# Patient Record
Sex: MALE | URBAN - METROPOLITAN AREA
[De-identification: names, ages, dates, MRNs, and addresses within clinical notes are randomized per-mention and may not be internally consistent; named-entity substitution may affect disease eponyms.]

---

## 2022-05-25 ENCOUNTER — APPOINTMENT (RX ONLY)
Dept: URBAN - METROPOLITAN AREA CLINIC 236 | Facility: CLINIC | Age: 87
Setting detail: DERMATOLOGY
End: 2022-05-25

## 2022-05-25 DIAGNOSIS — B37.2 CANDIDIASIS OF SKIN AND NAIL: ICD-10-CM | Status: INADEQUATELY CONTROLLED

## 2022-05-25 PROCEDURE — ? PRESCRIPTION MEDICATION MANAGEMENT

## 2022-05-25 PROCEDURE — ? PRESCRIPTION

## 2022-05-25 PROCEDURE — 99213 OFFICE O/P EST LOW 20 MIN: CPT

## 2022-05-25 PROCEDURE — ? COUNSELING

## 2022-05-25 RX ORDER — KETOCONAZOLE 20 MG/G
CREAM TOPICAL BID
Qty: 60 | Refills: 1 | Status: ERX | COMMUNITY
Start: 2022-05-25

## 2022-05-25 RX ORDER — FLUCONAZOLE 100 MG/1
TABLET ORAL
Qty: 14 | Refills: 0 | Status: ERX | COMMUNITY
Start: 2022-05-25

## 2022-05-25 RX ADMIN — FLUCONAZOLE: 100 TABLET ORAL at 00:00

## 2022-05-25 RX ADMIN — KETOCONAZOLE: 20 CREAM TOPICAL at 00:00

## 2022-05-25 ASSESSMENT — LOCATION ZONE DERM: LOCATION ZONE: TRUNK

## 2022-05-25 ASSESSMENT — LOCATION SIMPLE DESCRIPTION DERM
LOCATION SIMPLE: LEFT BUTTOCK
LOCATION SIMPLE: GROIN

## 2022-05-25 ASSESSMENT — LOCATION DETAILED DESCRIPTION DERM
LOCATION DETAILED: RIGHT INGUINAL CREASE
LOCATION DETAILED: SUPRAPUBIC SKIN
LOCATION DETAILED: LEFT BUTTOCK
LOCATION DETAILED: LEFT INGUINAL CREASE

## 2022-05-25 NOTE — PROCEDURE: PRESCRIPTION MEDICATION MANAGEMENT
Initiate Treatment: Diflucan 100mg Qd for 14 days \\nKetoconazle cream 2% bid
Detail Level: Zone
Render In Strict Bullet Format?: No

## 2022-06-15 ENCOUNTER — APPOINTMENT (RX ONLY)
Dept: URBAN - METROPOLITAN AREA CLINIC 236 | Facility: CLINIC | Age: 87
Setting detail: DERMATOLOGY
End: 2022-06-15

## 2022-06-15 DIAGNOSIS — L57.0 ACTINIC KERATOSIS: ICD-10-CM | Status: INADEQUATELY CONTROLLED

## 2022-06-15 DIAGNOSIS — D22 MELANOCYTIC NEVI: ICD-10-CM | Status: RESOLVED

## 2022-06-15 DIAGNOSIS — B37.2 CANDIDIASIS OF SKIN AND NAIL: ICD-10-CM | Status: INADEQUATELY CONTROLLED

## 2022-06-15 PROBLEM — D48.5 NEOPLASM OF UNCERTAIN BEHAVIOR OF SKIN: Status: ACTIVE | Noted: 2022-06-15

## 2022-06-15 PROCEDURE — ? TREATMENT REGIMEN

## 2022-06-15 PROCEDURE — 11102 TANGNTL BX SKIN SINGLE LES: CPT | Mod: 59

## 2022-06-15 PROCEDURE — ? COUNSELING

## 2022-06-15 PROCEDURE — ? LIQUID NITROGEN

## 2022-06-15 PROCEDURE — ? PRESCRIPTION

## 2022-06-15 PROCEDURE — 17004 DESTROY PREMAL LESIONS 15/>: CPT

## 2022-06-15 PROCEDURE — ? ADDITIONAL NOTES

## 2022-06-15 PROCEDURE — ? BIOPSY BY SHAVE METHOD

## 2022-06-15 PROCEDURE — 99213 OFFICE O/P EST LOW 20 MIN: CPT | Mod: 25

## 2022-06-15 PROCEDURE — 11103 TANGNTL BX SKIN EA SEP/ADDL: CPT

## 2022-06-15 RX ORDER — HYDROCORTISONE 25 MG/G
1 OINTMENT TOPICAL BID
Qty: 28.35 | Refills: 1 | Status: ERX | COMMUNITY
Start: 2022-06-15

## 2022-06-15 RX ORDER — ECONAZOLE NITRATE 10 MG/G
1 CREAM TOPICAL BID
Qty: 85 | Refills: 0 | Status: ERX | COMMUNITY
Start: 2022-06-15

## 2022-06-15 RX ADMIN — ECONAZOLE NITRATE 1: 10 CREAM TOPICAL at 00:00

## 2022-06-15 RX ADMIN — HYDROCORTISONE 1: 25 OINTMENT TOPICAL at 00:00

## 2022-06-15 ASSESSMENT — LOCATION ZONE DERM
LOCATION ZONE: ARM
LOCATION ZONE: SCALP
LOCATION ZONE: LEG
LOCATION ZONE: FACE
LOCATION ZONE: HAND
LOCATION ZONE: TRUNK
LOCATION ZONE: EAR

## 2022-06-15 ASSESSMENT — LOCATION SIMPLE DESCRIPTION DERM
LOCATION SIMPLE: RIGHT UPPER BACK
LOCATION SIMPLE: LEFT BUTTOCK
LOCATION SIMPLE: RIGHT CHEEK
LOCATION SIMPLE: RIGHT FOREARM
LOCATION SIMPLE: RIGHT EAR
LOCATION SIMPLE: RIGHT TEMPLE
LOCATION SIMPLE: LEFT FOREARM
LOCATION SIMPLE: LEFT CHEEK
LOCATION SIMPLE: RIGHT HAND
LOCATION SIMPLE: LEFT THIGH
LOCATION SIMPLE: RIGHT FOREHEAD
LOCATION SIMPLE: RIGHT SCALP
LOCATION SIMPLE: LEFT LOWER BACK
LOCATION SIMPLE: LEFT FOREHEAD
LOCATION SIMPLE: GLABELLA
LOCATION SIMPLE: LEFT SCALP

## 2022-06-15 ASSESSMENT — LOCATION DETAILED DESCRIPTION DERM
LOCATION DETAILED: LEFT FOREHEAD
LOCATION DETAILED: LEFT MEDIAL BUTTOCK
LOCATION DETAILED: LEFT DISTAL DORSAL FOREARM
LOCATION DETAILED: GLABELLA
LOCATION DETAILED: RIGHT CENTRAL MALAR CHEEK
LOCATION DETAILED: LEFT SUPERIOR FOREHEAD
LOCATION DETAILED: RIGHT INFERIOR FOREHEAD
LOCATION DETAILED: LEFT ANTERIOR PROXIMAL THIGH
LOCATION DETAILED: RIGHT CENTRAL FRONTAL SCALP
LOCATION DETAILED: LEFT INFERIOR LATERAL MALAR CHEEK
LOCATION DETAILED: RIGHT DISTAL DORSAL FOREARM
LOCATION DETAILED: LEFT PROXIMAL DORSAL FOREARM
LOCATION DETAILED: LEFT INFERIOR MEDIAL FOREHEAD
LOCATION DETAILED: RIGHT INFERIOR MEDIAL FOREHEAD
LOCATION DETAILED: RIGHT MID-UPPER BACK
LOCATION DETAILED: RIGHT MID TEMPLE
LOCATION DETAILED: RIGHT SUPERIOR MEDIAL FOREHEAD
LOCATION DETAILED: RIGHT ULNAR DORSAL HAND
LOCATION DETAILED: RIGHT SUPERIOR CRUS OF ANTIHELIX
LOCATION DETAILED: LEFT CENTRAL MALAR CHEEK
LOCATION DETAILED: RIGHT ANTIHELIX
LOCATION DETAILED: LEFT SUPERIOR LATERAL LOWER BACK
LOCATION DETAILED: LEFT MEDIAL FOREHEAD
LOCATION DETAILED: RIGHT PROXIMAL DORSAL FOREARM
LOCATION DETAILED: LEFT INFERIOR LATERAL FOREHEAD
LOCATION DETAILED: RIGHT SUPERIOR LATERAL MALAR CHEEK
LOCATION DETAILED: LEFT MEDIAL FRONTAL SCALP

## 2022-06-15 NOTE — PROCEDURE: BIOPSY BY SHAVE METHOD
Detail Level: Detailed
Depth Of Biopsy: dermis
Was A Bandage Applied: Yes
Size Of Lesion In Cm: 0.8
X Size Of Lesion In Cm: 0
Biopsy Type: H and E
Biopsy Method: 15 blade
Anesthesia Type: 1% lidocaine with epinephrine
Anesthesia Volume In Cc (Will Not Render If 0): 0.5
Hemostasis: TCA 35%
Wound Care: Polysporin ointment
Dressing: bandage
Destruction After The Procedure: No
Type Of Destruction Used: Curettage
Curettage Text: The wound bed was treated with curettage after the biopsy was performed.
Cryotherapy Text: The wound bed was treated with cryotherapy after the biopsy was performed.
Electrodesiccation Text: The wound bed was treated with electrodesiccation after the biopsy was performed.
Electrodesiccation And Curettage Text: The wound bed was treated with electrodesiccation and curettage after the biopsy was performed.
Silver Nitrate Text: The wound bed was treated with silver nitrate after the biopsy was performed.
Lab: [id number]
Lab Facility: [id number]
Consent: Verbal consent was obtained and risks were reviewed including but not limited to scarring, infection, bleeding, scabbing, incomplete removal, nerve damage and allergy to anesthesia.
Post-Care Instructions: I reviewed with the patient in detail post-care instructions. Patient is to keep the biopsy site dry overnight, and then apply bacitracin, or Vaseline twice daily until healed. NO NEOSPORIN!  Patient may apply hydrogen peroxide soaks to remove any crusting.
Notification Instructions: Patient will be notified of biopsy results. However, patient instructed to call the office if not contacted within 2 weeks.
Billing Type: Third-Party Bill
Information: Selecting Yes will display possible errors in your note based on the variables you have selected. This validation is only offered as a suggestion for you. PLEASE NOTE THAT THE VALIDATION TEXT WILL BE REMOVED WHEN YOU FINALIZE YOUR NOTE. IF YOU WANT TO FAX A PRELIMINARY NOTE YOU WILL NEED TO TOGGLE THIS TO 'NO' IF YOU DO NOT WANT IT IN YOUR FAXED NOTE.
Size Of Lesion In Cm: 2.4

## 2022-06-15 NOTE — PROCEDURE: LIQUID NITROGEN
Show Aperture Variable?: Yes
Detail Level: Detailed
Post-Care Instructions: I reviewed with the patient in detail post-care instructions. Patient is to wear sunprotection, and avoid picking at any of the treated lesions. Pt may apply Vaseline to crusted or scabbing areas.
Render Post-Care Instructions In Note?: no
Duration Of Freeze Thaw-Cycle (Seconds): 0
Consent: The patient's consent was obtained including but not limited to risks of crusting, scabbing, blistering, scarring, darker or lighter pigmentary change, recurrence, incomplete removal and infection.

## 2022-06-15 NOTE — PROCEDURE: ADDITIONAL NOTES
Additional Notes: See Resident notes for details. As the Supervising Physician, I was available to provide review of procedures/encounters with feedback provided after care. The care was delivered, and I provided personal identifiable direction to the Resident who provided the care for this patient.
Render Risk Assessment In Note?: no
Detail Level: Simple
Additional Notes: Did not removal full lesion
Additional Notes: See Residents notes for details. I saw the patient with the Resident and agree with the Resident's findings and plan.

## 2022-07-13 ENCOUNTER — APPOINTMENT (RX ONLY)
Dept: URBAN - METROPOLITAN AREA CLINIC 236 | Facility: CLINIC | Age: 87
Setting detail: DERMATOLOGY
End: 2022-07-13

## 2022-07-13 DIAGNOSIS — B37.2 CANDIDIASIS OF SKIN AND NAIL: ICD-10-CM | Status: STABLE

## 2022-07-13 DIAGNOSIS — L23.9 ALLERGIC CONTACT DERMATITIS, UNSPECIFIED CAUSE: ICD-10-CM | Status: INADEQUATELY CONTROLLED

## 2022-07-13 PROCEDURE — ? PRESCRIPTION

## 2022-07-13 PROCEDURE — 99213 OFFICE O/P EST LOW 20 MIN: CPT

## 2022-07-13 PROCEDURE — ? COUNSELING

## 2022-07-13 PROCEDURE — ? PRESCRIPTION MEDICATION MANAGEMENT

## 2022-07-13 RX ORDER — HYDROCORTISONE 25 MG/G
CREAM TOPICAL
Qty: 30 | Refills: 0 | Status: ERX | COMMUNITY
Start: 2022-07-13

## 2022-07-13 RX ORDER — FLUTICASONE PROPIONATE 0.5 MG/G
CREAM TOPICAL
Qty: 30 | Refills: 0 | Status: ERX | COMMUNITY
Start: 2022-07-13

## 2022-07-13 RX ADMIN — HYDROCORTISONE: 25 CREAM TOPICAL at 00:00

## 2022-07-13 RX ADMIN — FLUTICASONE PROPIONATE: 0.5 CREAM TOPICAL at 00:00

## 2022-07-13 ASSESSMENT — LOCATION DETAILED DESCRIPTION DERM
LOCATION DETAILED: LEFT ANTERIOR PROXIMAL THIGH
LOCATION DETAILED: INFERIOR LUMBAR SPINE
LOCATION DETAILED: RIGHT PROXIMAL PRETIBIAL REGION
LOCATION DETAILED: RIGHT ANTERIOR PROXIMAL THIGH

## 2022-07-13 ASSESSMENT — LOCATION ZONE DERM
LOCATION ZONE: LEG
LOCATION ZONE: TRUNK

## 2022-07-13 ASSESSMENT — LOCATION SIMPLE DESCRIPTION DERM
LOCATION SIMPLE: RIGHT PRETIBIAL REGION
LOCATION SIMPLE: RIGHT THIGH
LOCATION SIMPLE: LOWER BACK
LOCATION SIMPLE: LEFT THIGH

## 2022-07-13 NOTE — PROCEDURE: PRESCRIPTION MEDICATION MANAGEMENT
Detail Level: Zone
Continue Regimen: Econazole cream, and hydrocortisone cream QD
Render In Strict Bullet Format?: No

## 2022-08-17 ENCOUNTER — APPOINTMENT (RX ONLY)
Dept: URBAN - METROPOLITAN AREA CLINIC 236 | Facility: CLINIC | Age: 87
Setting detail: DERMATOLOGY
End: 2022-08-17

## 2022-08-17 DIAGNOSIS — L28.0 LICHEN SIMPLEX CHRONICUS: ICD-10-CM | Status: INADEQUATELY CONTROLLED

## 2022-08-17 PROCEDURE — 99214 OFFICE O/P EST MOD 30 MIN: CPT

## 2022-08-17 PROCEDURE — ? COUNSELING

## 2022-08-17 PROCEDURE — ? PRESCRIPTION

## 2022-08-17 RX ORDER — FLUTICASONE PROPIONATE 0.5 MG/G
CREAM TOPICAL QD
Qty: 30 | Refills: 1 | Status: ERX

## 2022-08-17 ASSESSMENT — LOCATION SIMPLE DESCRIPTION DERM: LOCATION SIMPLE: LOWER BACK

## 2022-08-17 ASSESSMENT — LOCATION DETAILED DESCRIPTION DERM: LOCATION DETAILED: SACRAL SPINE

## 2022-08-17 ASSESSMENT — LOCATION ZONE DERM: LOCATION ZONE: TRUNK
